# Patient Record
Sex: FEMALE | Race: WHITE | NOT HISPANIC OR LATINO | Employment: FULL TIME | URBAN - METROPOLITAN AREA
[De-identification: names, ages, dates, MRNs, and addresses within clinical notes are randomized per-mention and may not be internally consistent; named-entity substitution may affect disease eponyms.]

---

## 2023-08-11 ENCOUNTER — HOSPITAL ENCOUNTER (OUTPATIENT)
Dept: DATA CONVERSION | Facility: HOSPITAL | Age: 48
End: 2023-08-11
Attending: ANESTHESIOLOGY | Admitting: ANESTHESIOLOGY
Payer: COMMERCIAL

## 2023-08-11 DIAGNOSIS — M51.16 INTERVERTEBRAL DISC DISORDERS WITH RADICULOPATHY, LUMBAR REGION: ICD-10-CM

## 2023-08-11 DIAGNOSIS — M54.16 RADICULOPATHY, LUMBAR REGION: ICD-10-CM

## 2023-09-19 DIAGNOSIS — M43.06 LUMBAR SPONDYLOLYSIS: Primary | ICD-10-CM

## 2023-09-21 LAB
ANION GAP IN SER/PLAS: 12 MMOL/L (ref 10–20)
APPEARANCE, URINE: CLEAR
BASOPHILS (10*3/UL) IN BLOOD BY AUTOMATED COUNT: 0.07 X10E9/L (ref 0–0.1)
BASOPHILS/100 LEUKOCYTES IN BLOOD BY AUTOMATED COUNT: 1 % (ref 0–2)
BILIRUBIN, URINE: NEGATIVE
BLOOD, URINE: NEGATIVE
CALCIUM (MG/DL) IN SER/PLAS: 8.2 MG/DL (ref 8.6–10.3)
CARBON DIOXIDE, TOTAL (MMOL/L) IN SER/PLAS: 25 MMOL/L (ref 21–32)
CHLORIDE (MMOL/L) IN SER/PLAS: 106 MMOL/L (ref 98–107)
COLOR, URINE: COLORLESS
CREATININE (MG/DL) IN SER/PLAS: 0.77 MG/DL (ref 0.5–1.05)
EOSINOPHILS (10*3/UL) IN BLOOD BY AUTOMATED COUNT: 0.06 X10E9/L (ref 0–0.7)
EOSINOPHILS/100 LEUKOCYTES IN BLOOD BY AUTOMATED COUNT: 0.9 % (ref 0–6)
ERYTHROCYTE DISTRIBUTION WIDTH (RATIO) BY AUTOMATED COUNT: 12.7 % (ref 11.5–14.5)
ERYTHROCYTE MEAN CORPUSCULAR HEMOGLOBIN CONCENTRATION (G/DL) BY AUTOMATED: 32.9 G/DL (ref 32–36)
ERYTHROCYTE MEAN CORPUSCULAR VOLUME (FL) BY AUTOMATED COUNT: 89 FL (ref 80–100)
ERYTHROCYTES (10*6/UL) IN BLOOD BY AUTOMATED COUNT: 4.82 X10E12/L (ref 4–5.2)
GFR FEMALE: >90 ML/MIN/1.73M2
GLUCOSE (MG/DL) IN SER/PLAS: 79 MG/DL (ref 74–99)
GLUCOSE, URINE: NEGATIVE MG/DL
HEMATOCRIT (%) IN BLOOD BY AUTOMATED COUNT: 42.8 % (ref 36–46)
HEMOGLOBIN (G/DL) IN BLOOD: 14.1 G/DL (ref 12–16)
IMMATURE GRANULOCYTES/100 LEUKOCYTES IN BLOOD BY AUTOMATED COUNT: 0.3 % (ref 0–0.9)
INR IN PPP BY COAGULATION ASSAY: 1.1 (ref 0.9–1.1)
KETONES, URINE: NEGATIVE MG/DL
LEUKOCYTE ESTERASE, URINE: NEGATIVE
LEUKOCYTES (10*3/UL) IN BLOOD BY AUTOMATED COUNT: 6.7 X10E9/L (ref 4.4–11.3)
LYMPHOCYTES (10*3/UL) IN BLOOD BY AUTOMATED COUNT: 1.4 X10E9/L (ref 1.2–4.8)
LYMPHOCYTES/100 LEUKOCYTES IN BLOOD BY AUTOMATED COUNT: 21 % (ref 13–44)
MONOCYTES (10*3/UL) IN BLOOD BY AUTOMATED COUNT: 0.5 X10E9/L (ref 0.1–1)
MONOCYTES/100 LEUKOCYTES IN BLOOD BY AUTOMATED COUNT: 7.5 % (ref 2–10)
NEUTROPHILS (10*3/UL) IN BLOOD BY AUTOMATED COUNT: 4.62 X10E9/L (ref 1.2–7.7)
NEUTROPHILS/100 LEUKOCYTES IN BLOOD BY AUTOMATED COUNT: 69.3 % (ref 40–80)
NITRITE, URINE: NEGATIVE
PH, URINE: 7 (ref 5–8)
PLATELETS (10*3/UL) IN BLOOD AUTOMATED COUNT: 233 X10E9/L (ref 150–450)
POTASSIUM (MMOL/L) IN SER/PLAS: 4 MMOL/L (ref 3.5–5.3)
PROTEIN, URINE: NEGATIVE MG/DL
PROTHROMBIN TIME (PT) IN PPP BY COAGULATION ASSAY: 12.5 SEC (ref 9.8–12.8)
SODIUM (MMOL/L) IN SER/PLAS: 139 MMOL/L (ref 136–145)
SPECIFIC GRAVITY, URINE: 1 (ref 1–1.03)
UREA NITROGEN (MG/DL) IN SER/PLAS: 7 MG/DL (ref 6–23)
UROBILINOGEN, URINE: <2 MG/DL (ref 0–1.9)

## 2023-09-23 LAB — STAPH/MRSA SCREEN, CULTURE: NORMAL

## 2023-09-25 ENCOUNTER — HOSPITAL ENCOUNTER (OUTPATIENT)
Dept: DATA CONVERSION | Facility: HOSPITAL | Age: 48
End: 2023-09-25
Attending: ANESTHESIOLOGY
Payer: COMMERCIAL

## 2023-09-25 DIAGNOSIS — M51.16 INTERVERTEBRAL DISC DISORDERS WITH RADICULOPATHY, LUMBAR REGION: ICD-10-CM

## 2023-09-25 DIAGNOSIS — M54.16 RADICULOPATHY, LUMBAR REGION: ICD-10-CM

## 2023-09-29 ENCOUNTER — HOSPITAL ENCOUNTER (OUTPATIENT)
Dept: DATA CONVERSION | Facility: HOSPITAL | Age: 48
End: 2023-09-29
Attending: ORTHOPAEDIC SURGERY | Admitting: ORTHOPAEDIC SURGERY
Payer: COMMERCIAL

## 2023-09-29 DIAGNOSIS — Z53.9 PROCEDURE AND TREATMENT NOT CARRIED OUT, UNSPECIFIED REASON: ICD-10-CM

## 2023-09-29 DIAGNOSIS — M54.16 RADICULOPATHY, LUMBAR REGION: ICD-10-CM

## 2023-09-29 DIAGNOSIS — M51.26 OTHER INTERVERTEBRAL DISC DISPLACEMENT, LUMBAR REGION: ICD-10-CM

## 2023-10-01 NOTE — OP NOTE
Post Operative Note:     Post-Procedure Diagnosis: Left-sided lumbar disc  disease   Procedure: 1.   2.   3.   4.   5.   Surgeon: Aldo   Resident/Fellow/Other Assistant: MIGUELANGEL Pérez   Estimated Blood Loss (mL): none   Specimen: no   Findings: None     Operative Report Dictated:  Dictation: not applicable - note contains Operative  Report   Operative Report:    Preoperative diagnosis:  Lumbar radiculitis  Postoperative diagnosis:  Lumbar radiculitis, disc disease  Procedure:  Left L4/5 Lumbar transforaminal epidural steroid injection under fluoroscopic guidance  Surgeon: Pham Carlson  Assistant:  Fellow  Anesthesia: Local    Complications: Apparently none    Clinical note: Ms. Edwards is a 47-year-old female with a history of left-sided lumbar radiculitis and known disc issue.  She is here today for the aforementioned procedure.    Procedure note: The patient was met in the preoperative holding area after risks benefits and alternatives to procedure were discussed with the patient, informed consent was obtained. Patient brought back to the procedure room and placed in the prone  position on the fluoroscopy table. Area over the back was exposed, prepped, draped, in the usual sterile fashion.  Skin and subcutaneous tissues to the neuroforamen was anesthetized using 0.5% lidocaine.  A 22-gauge Sprotte needle was inserted in the  skin and advanced into the foramen. Needle tip position was confirmed in AP oblique and lateral view.  Contrast was injected which showed appropriate epidural spread, no intravascular or intrathecal uptake. A total of 1 mL of 0.5% lidocaine mixed with  10 mg dexamethasone was injected. Needle removed, bandage applied, patient tolerated the procedure well with no immediate complications.      Attestation:   Note Completion:  Attending Attestation I was present for the entire procedure         Electronic Signatures:  Pham Carlson)  (Signed 11-Aug-2023 07:50)   Authored: Post  Operative Note, Note Completion      Last Updated: 11-Aug-2023 07:50 by Pham Cralson)

## 2023-10-01 NOTE — OP NOTE
Post Operative Note:     Post-Procedure Diagnosis: Disc disease, left-sided  radiculitis   Procedure: 1.   2.   3.   4.   5.   Surgeon: Aldo   Resident/Fellow/Other Assistant: Sayda   Estimated Blood Loss (mL): none   Specimen: no   Findings: None     Operative Report Dictated:  Dictation: not applicable - note contains Operative  Report   Operative Report:    Preoperative diagnosis:  Lumbar radiculitis  Postoperative diagnosis:  Lumbar radiculitis, disc disease  Procedure: Left L4 lumbar transforaminal epidural steroid injection under fluoroscopic guidance  with methylprednisolone  Surgeon: Pham Carlson  Assistant:  Fellow  Anesthesia: Local    Complications: Apparently none    Clinical note: Ms Edwards is a 47-year-old female with a history of low back and left leg pain who presents today for the aforementioned procedure.  Patient does have an upcoming surgery that is  a discectomy and laminectomy.  No fusion.    Procedure note: The patient was met in the preoperative holding area after risks benefits and alternatives to procedure were discussed with the patient, informed consent was obtained. Patient brought back to the procedure room and placed in the prone  position on the fluoroscopy table. Area over the back was exposed, prepped, draped, in the usual sterile fashion.  Skin and subcutaneous tissues to the neuroforamen was anesthetized using 0.5% lidocaine.  A 22-gauge Sprotte needle was inserted in the  skin and advanced into the foramen. Needle tip position was confirmed in AP oblique and lateral view.  Contrast was injected which showed appropriate epidural spread, no intravascular or intrathecal uptake. A total of 1 mL of 0.5% lidocaine mixed with  with 40 mg of methylprednisolone was injected. Needle removed, bandage applied, patient tolerated the procedure well with no immediate complications.      Attestation:   Note Completion:  Attending Attestation I was present for the entire procedure          Electronic Signatures:  Pham Carlson)  (Signed 25-Sep-2023 07:57)   Authored: Post Operative Note, Note Completion      Last Updated: 25-Sep-2023 07:57 by Pham Carlson)

## 2023-10-02 ENCOUNTER — TELEPHONE (OUTPATIENT)
Dept: ORTHOPEDIC SURGERY | Facility: HOSPITAL | Age: 48
End: 2023-10-02
Payer: COMMERCIAL

## 2023-10-02 PROBLEM — G62.9 POLYNEUROPATHY: Status: ACTIVE | Noted: 2023-10-02

## 2023-10-02 PROBLEM — L91.8 OTHER HYPERTROPHIC DISORDERS OF THE SKIN: Status: ACTIVE | Noted: 2021-01-06

## 2023-10-02 PROBLEM — L71.9 ROSACEA, UNSPECIFIED: Status: ACTIVE | Noted: 2021-01-06

## 2023-10-02 PROBLEM — N93.9 ABNORMAL UTERINE BLEEDING (AUB): Status: ACTIVE | Noted: 2023-10-02

## 2023-10-02 PROBLEM — E66.9 OBESITY (BMI 30.0-34.9): Status: ACTIVE | Noted: 2023-10-02

## 2023-10-02 PROBLEM — K25.9 GASTRIC ULCER: Status: ACTIVE | Noted: 2023-10-02

## 2023-10-02 PROBLEM — L21.8 OTHER SEBORRHEIC DERMATITIS: Status: ACTIVE | Noted: 2021-01-06

## 2023-10-02 PROBLEM — D22.62 MELANOCYTIC NEVI OF LEFT UPPER LIMB, INCLUDING SHOULDER: Status: ACTIVE | Noted: 2021-01-06

## 2023-10-02 PROBLEM — D22.30 MELANOCYTIC NEVI OF UNSPECIFIED PART OF FACE: Status: ACTIVE | Noted: 2021-01-06

## 2023-10-02 PROBLEM — L82.1 OTHER SEBORRHEIC KERATOSIS: Status: ACTIVE | Noted: 2021-01-06

## 2023-10-02 PROBLEM — L90.5 SCAR CONDITION AND FIBROSIS OF SKIN: Status: ACTIVE | Noted: 2021-01-06

## 2023-10-02 PROBLEM — D22.39 MELANOCYTIC NEVI OF OTHER PARTS OF FACE: Status: ACTIVE | Noted: 2021-01-06

## 2023-10-02 PROBLEM — K21.9 GASTROESOPHAGEAL REFLUX DISEASE: Status: ACTIVE | Noted: 2023-10-02

## 2023-10-02 PROBLEM — M94.0 COSTOCHONDRITIS: Status: ACTIVE | Noted: 2023-10-02

## 2023-10-02 PROBLEM — R63.1 POLYDIPSIA: Status: ACTIVE | Noted: 2023-10-02

## 2023-10-02 PROBLEM — J32.9 CHRONIC SINUSITIS: Status: ACTIVE | Noted: 2023-10-02

## 2023-10-02 PROBLEM — M54.16 LEFT LUMBAR RADICULOPATHY: Status: ACTIVE | Noted: 2023-10-02

## 2023-10-02 PROBLEM — D36.9 BENIGN NEOPLASM: Status: ACTIVE | Noted: 2021-01-06

## 2023-10-02 PROBLEM — M62.830 MUSCLE SPASM OF BACK: Status: ACTIVE | Noted: 2023-10-02

## 2023-10-02 PROBLEM — D48.5 NEOPLASM OF UNCERTAIN BEHAVIOR OF SKIN: Status: ACTIVE | Noted: 2021-01-06

## 2023-10-02 PROBLEM — R74.8 ABNORMAL LIVER ENZYMES: Status: ACTIVE | Noted: 2023-10-02

## 2023-10-02 PROBLEM — D18.00 ANGIOMA: Status: ACTIVE | Noted: 2021-01-06

## 2023-10-02 PROBLEM — D18.01 HEMANGIOMA OF SKIN AND SUBCUTANEOUS TISSUE: Status: ACTIVE | Noted: 2021-01-06

## 2023-10-02 PROBLEM — I35.1 AORTIC CUSP REGURGITATION: Status: ACTIVE | Noted: 2023-10-02

## 2023-10-02 PROBLEM — U07.1 DISEASE DUE TO SEVERE ACUTE RESPIRATORY SYNDROME CORONAVIRUS 2 (SARS-COV-2): Status: ACTIVE | Noted: 2023-10-02

## 2023-10-02 PROBLEM — D22.70 MELANOCYTIC NEVI OF UNSPECIFIED LOWER LIMB, INCLUDING HIP: Status: ACTIVE | Noted: 2021-01-06

## 2023-10-02 PROBLEM — E05.90 HYPERTHYROIDISM: Status: ACTIVE | Noted: 2023-10-02

## 2023-10-02 PROBLEM — L60.9 NAIL DISORDER, UNSPECIFIED: Status: ACTIVE | Noted: 2021-01-06

## 2023-10-02 PROBLEM — I78.1 NON-NEOPLASTIC NEVUS: Status: ACTIVE | Noted: 2021-01-06

## 2023-10-02 PROBLEM — R20.2 PARESTHESIA: Status: ACTIVE | Noted: 2023-10-02

## 2023-10-02 PROBLEM — E55.9 VITAMIN D DEFICIENCY: Status: ACTIVE | Noted: 2023-10-02

## 2023-10-02 PROBLEM — E66.9 OBESITY: Status: ACTIVE | Noted: 2023-10-02

## 2023-10-02 PROBLEM — K46.9 ABDOMINAL HERNIA: Status: ACTIVE | Noted: 2023-10-02

## 2023-10-02 PROBLEM — L21.9 SEBORRHEIC DERMATITIS, UNSPECIFIED: Status: ACTIVE | Noted: 2021-01-06

## 2023-10-02 PROBLEM — L81.4 OTHER MELANIN HYPERPIGMENTATION: Status: ACTIVE | Noted: 2021-01-06

## 2023-10-02 PROBLEM — B35.1 TINEA UNGUIUM: Status: ACTIVE | Noted: 2021-01-06

## 2023-10-02 PROBLEM — H69.90 EUSTACHIAN TUBE DISORDER: Status: ACTIVE | Noted: 2023-10-02

## 2023-10-02 PROBLEM — L82.1 SEBORRHEIC KERATOSIS: Status: ACTIVE | Noted: 2021-01-06

## 2023-10-02 PROBLEM — D22.5 MELANOCYTIC NEVI OF TRUNK: Status: ACTIVE | Noted: 2021-01-06

## 2023-10-02 PROBLEM — D23.39 OTHER BENIGN NEOPLASM OF SKIN OF OTHER PARTS OF FACE: Status: ACTIVE | Noted: 2021-01-06

## 2023-10-02 RX ORDER — DEXAMETHASONE 6 MG/1
TABLET ORAL
COMMUNITY
Start: 2023-08-08

## 2023-10-02 RX ORDER — GABAPENTIN 600 MG/1
600 TABLET ORAL 3 TIMES DAILY
COMMUNITY
Start: 2023-08-25

## 2023-10-02 RX ORDER — LEVONORGESTREL 52 MG/1
INTRAUTERINE DEVICE INTRAUTERINE
COMMUNITY
Start: 2018-10-05

## 2023-10-02 RX ORDER — HYDROXYZINE HYDROCHLORIDE 25 MG/1
25 TABLET, FILM COATED ORAL 3 TIMES DAILY
COMMUNITY
Start: 2023-09-18

## 2023-10-02 RX ORDER — CYCLOBENZAPRINE HCL 10 MG
10 TABLET ORAL 2 TIMES DAILY PRN
COMMUNITY

## 2023-10-02 RX ORDER — OMEPRAZOLE 40 MG/1
40 CAPSULE, DELAYED RELEASE ORAL
COMMUNITY

## 2023-10-02 RX ORDER — FLUTICASONE PROPIONATE 50 MCG
1 SPRAY, SUSPENSION (ML) NASAL DAILY
COMMUNITY

## 2023-10-02 RX ORDER — METFORMIN HYDROCHLORIDE 500 MG/1
500 TABLET ORAL
COMMUNITY

## 2023-10-02 RX ORDER — SEMAGLUTIDE 1.34 MG/ML
1 INJECTION, SOLUTION SUBCUTANEOUS
COMMUNITY
Start: 2023-08-25

## 2023-10-02 RX ORDER — VIT C/E/ZN/COPPR/LUTEIN/ZEAXAN 250MG-90MG
1 CAPSULE ORAL DAILY
COMMUNITY
Start: 2022-01-30

## 2023-10-02 RX ORDER — TRIAMCINOLONE ACETONIDE 5 MG/G
OINTMENT TOPICAL 2 TIMES DAILY
COMMUNITY
Start: 2023-07-20

## 2023-10-02 RX ORDER — METRONIDAZOLE 7.5 MG/G
1 CREAM TOPICAL
COMMUNITY
Start: 2021-01-06

## 2023-10-02 RX ORDER — GABAPENTIN 300 MG/1
300 CAPSULE ORAL 2 TIMES DAILY
COMMUNITY
Start: 2023-08-08 | End: 2023-09-07 | Stop reason: WASHOUT

## 2023-10-02 RX ORDER — KETOCONAZOLE 20 MG/G
1 CREAM TOPICAL
COMMUNITY
Start: 2018-08-16

## 2023-10-02 RX ORDER — CHLORHEXIDINE GLUCONATE ORAL RINSE 1.2 MG/ML
15 SOLUTION DENTAL 2 TIMES DAILY
COMMUNITY
Start: 2023-09-21

## 2023-10-02 RX ORDER — SEMAGLUTIDE 0.68 MG/ML
0.25 INJECTION, SOLUTION SUBCUTANEOUS
COMMUNITY
Start: 2023-07-08

## 2023-10-02 NOTE — TELEPHONE ENCOUNTER
Patient called asking if she would need to schedule an appointment for a surgery consult with you again since you cancelled her surgery last Friday or can I just inform you of when she wants to try and move forward again with surgery.

## 2023-10-04 ENCOUNTER — APPOINTMENT (OUTPATIENT)
Dept: ORTHOPEDIC SURGERY | Facility: CLINIC | Age: 48
End: 2023-10-04

## 2023-10-11 ENCOUNTER — APPOINTMENT (OUTPATIENT)
Dept: ORTHOPEDIC SURGERY | Facility: CLINIC | Age: 48
End: 2023-10-11

## 2024-01-10 ENCOUNTER — TELEMEDICINE (OUTPATIENT)
Dept: ORTHOPEDIC SURGERY | Facility: CLINIC | Age: 49
End: 2024-01-10
Payer: COMMERCIAL

## 2024-01-10 DIAGNOSIS — M54.16 LUMBAR RADICULOPATHY: Primary | ICD-10-CM

## 2024-01-10 NOTE — PROGRESS NOTES
Patient was scheduled for surgery last fall, at the time of surgery she opted to wait because her symptoms were improving spontaneously.  She did have an L4-5 extruded disc fragment with partial left foot drop, radicular pain in the left leg, and numbness in the foot.  Prior to surgery she underwent physical therapy and several injections.    Now back feels fine, occasional sore  Left foot still feels numb/weak, no better or worse  3 days around Emily had some radicular pain, then went away    She is concerned about the ongoing numbness and weakness in the left foot.  At this point I think it is worthwhile to check a new MRI of her lumbar spine as well as flexion/extension x-rays.  She should call me after these are complete and we will discuss the results over the telephone.    *This note was dictated using speech recognition software and was not corrected for spelling or grammatical errors*

## 2024-01-24 ENCOUNTER — HOSPITAL ENCOUNTER (OUTPATIENT)
Dept: RADIOLOGY | Facility: HOSPITAL | Age: 49
Discharge: HOME | End: 2024-01-24
Payer: COMMERCIAL

## 2024-01-24 DIAGNOSIS — M54.16 LUMBAR RADICULOPATHY: ICD-10-CM

## 2024-01-24 PROCEDURE — 72120 X-RAY BEND ONLY L-S SPINE: CPT

## 2024-01-24 PROCEDURE — 72110 X-RAY EXAM L-2 SPINE 4/>VWS: CPT | Performed by: RADIOLOGY

## 2024-01-24 PROCEDURE — 72110 X-RAY EXAM L-2 SPINE 4/>VWS: CPT

## 2024-01-24 PROCEDURE — 72148 MRI LUMBAR SPINE W/O DYE: CPT

## 2024-01-24 PROCEDURE — 72148 MRI LUMBAR SPINE W/O DYE: CPT | Performed by: STUDENT IN AN ORGANIZED HEALTH CARE EDUCATION/TRAINING PROGRAM

## 2024-01-26 ENCOUNTER — TELEMEDICINE (OUTPATIENT)
Dept: PAIN MEDICINE | Facility: HOSPITAL | Age: 49
End: 2024-01-26
Payer: COMMERCIAL

## 2024-01-26 DIAGNOSIS — M54.16 LUMBAR RADICULOPATHY: ICD-10-CM

## 2024-01-26 PROCEDURE — 99214 OFFICE O/P EST MOD 30 MIN: CPT | Performed by: ANESTHESIOLOGY

## 2024-01-26 ASSESSMENT — PAIN SCALES - GENERAL: PAINLEVEL: 6

## 2024-01-26 NOTE — PROGRESS NOTES
Chief Complaint   Patient presents with    Back Pain    Leg Pain      HPI   Ms. Edwards is a 48-year-old female with history of low back and leg pain.  The patient was last seen in September of last year at which time we did a transforaminal epidural at the L4 level.  The patient had a disc related issue at that area, L4-5 and we did 2 injections.  The patient ultimately improved to a degree where she did not have to pursue spine surgery, it was planned and she was actually at the hospital for the surgery but after discussion with her surgeon they elected to hold off.  The patient had been doing well until more recently and is now struggling with low back and left-sided leg symptoms.  She feels that the back and the leg symptoms are not directly connected and do not necessarily occur at the same time.  She is having pain that radiates down the left lower extremity down the left side of the calf and into the big toe.  The pain can be burning, tingling, shocking in nature.  She feels a numbness sensation in the leg and foot.  She has continued to do physician directed exercises learned at formal PT 4 times a week as a treatment and maintenance protocol over the past 6 months.  She also takes gabapentin 300 mg nightly but is unable to tolerate it during the day.  The patient says gabapentin can make her feel weird or have cognitive effects.  For the back pain sitting for a long time can make it worse and her job is primarily sedentary, she is on Zoom a lot.  Denies any red flags.    Her surgeon specifically is referring her back to target the L5/S1 area, she also has impingement at L4-5.    ROS: 13 point review of systems is complete and is negative listed above in HPI    Imaging:  Interpreted By:  Danie Celeste,   STUDY:  MR LUMBAR SPINE WO IV CONTRAST;  1/24/2024 1:40 pm      INDICATION:  Signs/Symptoms:ongoing left leg radicular pain, partial left foot  drop.      COMPARISON:  Lumbar spine radiograph  01/24/2024      ACCESSION NUMBER(S):  [id number]      ORDERING CLINICIAN:  REHAN MCDOWELL      TECHNIQUE:  Sagittal T1, T2, STIR, axial T1 and T2 weighted images of the lumbar  spine were acquired.      FINDINGS:  Alignment: The vertebral alignment is maintained.      Vertebrae/Intervertebral Discs: The vertebral bodies demonstrate  expected height.The marrow signal is within normal limits. The  intervertebral discs also demonstrate normal signal and morphology.      Conus: The lower thoracic cord appears unremarkable. The conus  terminates at .      T12-L1:  There is no significant central canal or neural foraminal  stenosis.      L1-2:  There is no significant central canal or neural foraminal  stenosis.      L2-3: Small circumferential disc bulge and mild facet degenerative  changes with flattening of the ventral thecal sac. There is no  significant central canal or neural foraminal stenosis.      L3-4:  There is no significant central canal or neural foraminal  stenosis.      L4-5: Endplate remodeling. Disc bulge asymmetric to the left, mild  facet degenerative changes with flattening of the ventral thecal sac.  No significant canal stenosis. Mild left subarticular recess  narrowing. Moderate left neural foraminal narrowing with abutment of  the exiting left L4 nerve root. Mild right L4 neural foraminal  narrowing.      L5-S1: Endplate remodeling, small circumferential disc bulge without  significant canal stenosis. Mild bilateral neural foraminal narrowing.      The prevertebral and posterior paraspinous soft tissues are  unremarkable.      IMPRESSION:  No acute findings.  Degenerative changes at L4-L5 and L5-S1 with up to moderate left  neural foraminal narrowing and abutment of the exiting L4 and nerve  root at L4-L5. No significant canal stenosis.    Impression/Plan:  48-year-old female with a history of discogenic and vertebrogenic back pain as well as more acute on chronic low back and leg symptoms which  are radicular in nature on the left side only.  Pain is mostly in an L5 type distribution.    Will plan for left-sided transforaminal at L4 and L5.  Procedure, risk, benefits, alternatives reviewed with the patient.  Will use methylprednisolone.    Continue gabapentin.    Encouraged to keep up with an exercise program and core strengthening.

## 2024-02-07 ENCOUNTER — HOSPITAL ENCOUNTER (OUTPATIENT)
Dept: RADIOLOGY | Facility: HOSPITAL | Age: 49
Discharge: HOME | End: 2024-02-07
Payer: COMMERCIAL

## 2024-02-07 VITALS
HEIGHT: 68 IN | TEMPERATURE: 97.9 F | SYSTOLIC BLOOD PRESSURE: 116 MMHG | DIASTOLIC BLOOD PRESSURE: 72 MMHG | HEART RATE: 85 BPM | WEIGHT: 200 LBS | RESPIRATION RATE: 18 BRPM | OXYGEN SATURATION: 98 % | BODY MASS INDEX: 30.31 KG/M2

## 2024-02-07 DIAGNOSIS — M54.16 LUMBAR RADICULOPATHY: ICD-10-CM

## 2024-02-07 PROCEDURE — 64483 NJX AA&/STRD TFRM EPI L/S 1: CPT | Performed by: ANESTHESIOLOGY

## 2024-02-07 PROCEDURE — 64484 NJX AA&/STRD TFRM EPI L/S EA: CPT | Performed by: ANESTHESIOLOGY

## 2024-02-07 PROCEDURE — 2720000007 HC OR 272 NO HCPCS

## 2024-02-07 PROCEDURE — 77003 FLUOROGUIDE FOR SPINE INJECT: CPT

## 2024-02-07 PROCEDURE — 64483 NJX AA&/STRD TFRM EPI L/S 1: CPT | Mod: LT | Performed by: ANESTHESIOLOGY

## 2024-02-07 PROCEDURE — 64484 NJX AA&/STRD TFRM EPI L/S EA: CPT | Mod: LT | Performed by: ANESTHESIOLOGY

## 2024-02-07 ASSESSMENT — PAIN SCALES - GENERAL
PAINLEVEL_OUTOF10: 4
PAINLEVEL_OUTOF10: 5 - MODERATE PAIN
PAINLEVEL_OUTOF10: 2
PAINLEVEL_OUTOF10: 0 - NO PAIN

## 2024-02-07 ASSESSMENT — PAIN - FUNCTIONAL ASSESSMENT
PAIN_FUNCTIONAL_ASSESSMENT: 0-10
PAIN_FUNCTIONAL_ASSESSMENT: 0-10

## 2024-02-07 NOTE — PROCEDURES
Preoperative diagnosis:  Lumbar radiculitis  Postoperative diagnosis:  Lumbar radiculitis, recurrent stenosis  Procedure: Left-sided L4-5 and L5-S1 lumbar transforaminal epidural steroid injection under fluoroscopic guidance  Surgeon: Pham Carlson  Assistant:  Fellow, Vahid Gonsalez  Anesthesia: Local   Complications: Apparently none    Clinical note: Ms. Edwards is a 48-year-old female with a history of back and leg symptoms who was sent back by her spine surgeon to consider targeted epidural injections.    Procedure note: The patient was met in the preoperative holding area after risks benefits and alternatives to procedure were discussed with the patient, informed consent was obtained. Patient brought back to the procedure room and placed in the prone position on the fluoroscopy table. Area over the back was exposed, prepped, draped, in the usual sterile fashion.  Skin and subcutaneous tissues to the neuroforamen was anesthetized using 0.5% lidocaine.  22-gauge Sprotte needles were inserted in the skin and advanced into the foramen. Needle tip position was confirmed in AP oblique and lateral view.  Contrast was injected which showed appropriate epidural spread, no intravascular or intrathecal uptake. A total of 3 mL of 0.5% lidocaine mixed with 40 mg of methylprednisolone was injected in divided doses among the 2 needles. Needles removed, bandage applied, patient tolerated the procedure well with no immediate complications.

## 2024-02-07 NOTE — H&P
History Of Present Illness  Mahnaz Edwards is a 48 y.o. female presenting with axial low back pain as well as radicular lumbar pain.     Past Medical History  Past Medical History:   Diagnosis Date    Bilateral primary osteoarthritis of knee 06/15/2018    Osteoarthritis of patellofemoral joints of both knees    Bunionette of unspecified foot 12/05/2016    Bunionette    Contusion of left foot, initial encounter 03/17/2017    Contusion of left foot, initial encounter    Cutaneous abscess, unspecified 03/02/2017    Abscess    Flat foot (pes planus) (acquired), unspecified foot 01/11/2016    Pes planus, flexible    Iliotibial band syndrome, left leg 11/22/2015    Iliotibial band syndrome, left    Other conditions influencing health status 11/10/2016    Abdominal pain, unspecified location    Other specified joint disorders, left knee 11/22/2015    Patellofemoral dysfunction, left    Other specified noninflammatory disorders of vulva and perineum     Vulvar irritation    Pain in leg, unspecified 11/19/2015    Lower extremity pain    Pain in right knee     Pain in both knees, unspecified chronicity    Personal history of other diseases of the female genital tract 09/20/2018    History of vaginal pruritus    Personal history of other specified conditions 09/20/2018    History of urinary frequency    Personal history of other specified conditions 11/10/2016    History of headache    Personal history of other specified conditions 11/10/2016    History of nausea and vomiting    Personal history of other specified conditions 06/19/2017    History of chest pain at rest    Personal history of other specified conditions 03/28/2016    History of shortness of breath    Personal history of urinary (tract) infections 09/21/2018    History of urinary tract infection    Plantar fascial fibromatosis 12/04/2017    Plantar fasciitis, left    Pruritus vulvae     Vulvar itching    Tarsal tunnel syndrome, unspecified lower limb 03/25/2016     Tarsal tunnel syndrome       Surgical History  Past Surgical History:   Procedure Laterality Date     SECTION, LOW TRANSVERSE  10/05/2018     Section Low Transverse    DILATION AND CURETTAGE OF UTERUS  10/05/2018    Dilation And Curettage    OTHER SURGICAL HISTORY  2016    Decompression Tarsal Tunnel Release Right Foot        Social History  She has no history on file for tobacco use, alcohol use, and drug use.    Family History  Family History   Problem Relation Name Age of Onset    Coronary artery disease Father      Peripheral vascular disease Father      Diabetes Father      Other (bicuspid aortic valve) Son      Other (cardiac disorder) Other      Stroke Other      Hypertension Other          Allergies  Naltrexone-bupropion    Review of Systems   13 point ROS done and negative except for the above.   Physical Exam     General: NAD, well nourished   Eyes: Non-icteric sclera, EOMI  Ears, Nose, Mouth, and Throat: External ears and nose appear to be without deformity or rash. No lesions or masses noted. Hearing is grossly intact.   Neck: Trachea midline  Respiratory: Nonlabored breathing   Cardiovascular: No JVD  Skin: No rashes or open lesions/ulcers identified on skin.    Last Recorded Vitals  There were no vitals taken for this visit.    Relevant Results           Assessment/Plan   Problem List Items Addressed This Visit    None      Patient is a 48-year female with past medical history significant for both discogenic as well as lumbar radicular pain.  Will target left-sided L4 and L5 lumbar transforaminal epidural steroid injections.    Risks, benefits, alternatives to the procedure were discussed in detail and patient was amenable to proceeding.    Deshaun Gonsalez MD